# Patient Record
Sex: MALE | Race: WHITE
[De-identification: names, ages, dates, MRNs, and addresses within clinical notes are randomized per-mention and may not be internally consistent; named-entity substitution may affect disease eponyms.]

---

## 2018-02-10 ENCOUNTER — HOSPITAL ENCOUNTER (EMERGENCY)
Dept: HOSPITAL 80 - FED | Age: 23
Discharge: HOME | End: 2018-02-10
Payer: COMMERCIAL

## 2018-02-10 VITALS
DIASTOLIC BLOOD PRESSURE: 72 MMHG | SYSTOLIC BLOOD PRESSURE: 122 MMHG | OXYGEN SATURATION: 94 % | HEART RATE: 84 BPM | TEMPERATURE: 99 F

## 2018-02-10 VITALS — RESPIRATION RATE: 18 BRPM

## 2018-02-10 DIAGNOSIS — L05.01: Primary | ICD-10-CM

## 2018-02-10 NOTE — EDPHY
H & P


Time Seen by Provider: 02/10/18 20:55


HPI/ROS: 





CHIEF COMPLAINT:  Abscess





HISTORY OF PRESENT ILLNESS:  22-year-old male presents to the emergency 

department with drainage from a wound in the upper part of his buttock.  

Patient states that he fell snowboarding 2 days ago and since that time has 

developed some drainage.  The patient denies any problems with urination.  

Denies any issues with bowel movements.  No fevers or chills.  No abdominal 

pain. He has never had this in the past.





ROS:  No fevers, chills, abdominal pain.


Past Medical/Surgical History: 





Negative


Social History: 





Single


Smoking Status: Never smoked


Physical Exam: 





On examination the patient is afebrile.  No apparent distress. On examination 

the patient has evidence of pilonidal cyst.  There is a small opening where 

purulent drainage was expressed.  No sounding redness or signs of cellulitis.  

Minimally tender to palpate.  No palpable bony tenderness.


Constitutional: 





 Initial Vital Signs











Temperature (C)  36.7 C   02/10/18 20:30


 


Heart Rate  87   02/10/18 20:30


 


Respiratory Rate  18   02/10/18 20:30


 


Blood Pressure  141/77 H  02/10/18 20:30


 


O2 Sat (%)  96   02/10/18 20:30








 











O2 Delivery Mode               Room Air














Allergies/Adverse Reactions: 


 





No Known Allergies Allergy (Unverified 02/10/18 20:30)


 








Home Medications: 














 Medication  Instructions  Recorded


 


Cephalexin [Keflex] 500 mg PO QID #28 cap 02/10/18














MDM/Departure





- MDM


Procedures: 





Procedure:  Abscess drainage.


The patient's abscess was located on the buttock.  Risks, benefits, 

alternatives discussed with the patient and consent obtained.  The abscess was 

already draining and just required some palpation to express further purulent 

drainage. No packing required.  The patient tolerated the procedure well.  The 

procedure was performed by myself.


ED Course/Re-evaluation: 





22-year-old male presents with pilonidal cyst.  Purulent drainage expressed.  

Patient will be started on Keflex.  He was encouraged and soak the area.  

Packing was not placed.





- Depart


Disposition: Home, Routine, Self-Care


Clinical Impression: 


 Pilonidal cyst with abscess





Condition: Good


Instructions:  Pilonidal Cyst (ED)


Additional Instructions: 


Keflex 500 mg 4 times daily for 1 week.





Soak area in warm water as discussed for 15-20 minutes every 2-3 hours 

especially today and tomorrow.  Apply antibiotic ointment such as Neosporin or 

bacitracin.





Return to the emergency department if you develop a fever, increasing pain, or 

any other concerns.


Prescriptions: 


Cephalexin [Keflex] 500 mg PO QID #28 cap


Referrals: 


Eugene Peacock MD [Medical Doctor] - 5-7 days, call for appt. (General 

surgeon on-call)